# Patient Record
Sex: MALE | Race: AMERICAN INDIAN OR ALASKA NATIVE | URBAN - NONMETROPOLITAN AREA
[De-identification: names, ages, dates, MRNs, and addresses within clinical notes are randomized per-mention and may not be internally consistent; named-entity substitution may affect disease eponyms.]

---

## 2018-03-08 ENCOUNTER — HOSPITAL ENCOUNTER (EMERGENCY)
Facility: OTHER | Age: 63
Discharge: HOME | End: 2018-03-08
Attending: FAMILY MEDICINE | Admitting: FAMILY MEDICINE
Payer: COMMERCIAL

## 2018-03-08 VITALS
HEART RATE: 81 BPM | TEMPERATURE: 97.6 F | HEIGHT: 67 IN | WEIGHT: 165 LBS | BODY MASS INDEX: 25.9 KG/M2 | SYSTOLIC BLOOD PRESSURE: 126 MMHG | DIASTOLIC BLOOD PRESSURE: 69 MMHG | RESPIRATION RATE: 16 BRPM | OXYGEN SATURATION: 96 %

## 2018-03-08 DIAGNOSIS — F02.818 DEMENTIA DUE TO MEDICAL CONDITION WITH BEHAVIORAL DISTURBANCE (H): ICD-10-CM

## 2018-03-08 LAB
ALBUMIN SERPL-MCNC: 4.1 G/DL (ref 3.5–5.7)
ALP SERPL-CCNC: 89 U/L (ref 34–104)
ALT SERPL W P-5'-P-CCNC: 16 U/L (ref 7–52)
AMPHETAMINES UR QL SCN: NOT DETECTED
ANION GAP SERPL CALCULATED.3IONS-SCNC: 10 MMOL/L (ref 3–14)
AST SERPL W P-5'-P-CCNC: 18 U/L (ref 13–39)
BARBITURATES UR QL: NOT DETECTED
BASOPHILS # BLD AUTO: 0 10E9/L (ref 0–0.2)
BASOPHILS NFR BLD AUTO: 0.5 %
BENZODIAZ UR QL: NOT DETECTED
BILIRUB SERPL-MCNC: 1.3 MG/DL (ref 0.3–1)
BUN SERPL-MCNC: 13 MG/DL (ref 7–25)
BUPRENORPHINE UR QL: NOT DETECTED NG/ML
CALCIUM SERPL-MCNC: 9 MG/DL (ref 8.6–10.3)
CANNABINOIDS UR QL: NOT DETECTED NG/ML
CHLORIDE SERPL-SCNC: 101 MMOL/L (ref 98–107)
CO2 SERPL-SCNC: 27 MMOL/L (ref 21–31)
COCAINE UR QL: NOT DETECTED
CREAT SERPL-MCNC: 0.57 MG/DL (ref 0.7–1.3)
D-METHAMPHET UR QL: NOT DETECTED NG/ML
DIFFERENTIAL METHOD BLD: ABNORMAL
EOSINOPHIL # BLD AUTO: 0.1 10E9/L (ref 0–0.7)
EOSINOPHIL NFR BLD AUTO: 1.7 %
ERYTHROCYTE [DISTWIDTH] IN BLOOD BY AUTOMATED COUNT: 12.5 % (ref 10–15)
ETHANOL SERPL-MCNC: <0.01 %
GFR SERPL CREATININE-BSD FRML MDRD: >90 ML/MIN/1.7M2
GLUCOSE SERPL-MCNC: 104 MG/DL (ref 70–105)
HCT VFR BLD AUTO: 39 % (ref 40–53)
HGB BLD-MCNC: 13.8 G/DL (ref 13.3–17.7)
IMM GRANULOCYTES # BLD: 0 10E9/L (ref 0–0.4)
IMM GRANULOCYTES NFR BLD: 0.3 %
LYMPHOCYTES # BLD AUTO: 1.4 10E9/L (ref 0.8–5.3)
LYMPHOCYTES NFR BLD AUTO: 21.8 %
MCH RBC QN AUTO: 33 PG (ref 26.5–33)
MCHC RBC AUTO-ENTMCNC: 35.4 G/DL (ref 31.5–36.5)
MCV RBC AUTO: 93 FL (ref 78–100)
METHADONE UR QL SCN: NOT DETECTED
MONOCYTES # BLD AUTO: 0.8 10E9/L (ref 0–1.3)
MONOCYTES NFR BLD AUTO: 12.8 %
NEUTROPHILS # BLD AUTO: 4.1 10E9/L (ref 1.6–8.3)
NEUTROPHILS NFR BLD AUTO: 62.9 %
OPIATES UR QL SCN: NOT DETECTED
OXYCODONE UR QL: NOT DETECTED NG/ML
PCP UR QL SCN: NOT DETECTED
PLATELET # BLD AUTO: 195 10E9/L (ref 150–450)
POTASSIUM SERPL-SCNC: 3.1 MMOL/L (ref 3.5–5.1)
PROPOXYPH UR QL: NOT DETECTED NG/ML
PROT SERPL-MCNC: 7 G/DL (ref 6.4–8.9)
RBC # BLD AUTO: 4.18 10E12/L (ref 4.4–5.9)
SODIUM SERPL-SCNC: 138 MMOL/L (ref 134–144)
TRICYCLICS UR QL SCN: NOT DETECTED NG/ML
WBC # BLD AUTO: 6.4 10E9/L (ref 4–11)

## 2018-03-08 PROCEDURE — 25000132 ZZH RX MED GY IP 250 OP 250 PS 637: Performed by: FAMILY MEDICINE

## 2018-03-08 PROCEDURE — 93010 ELECTROCARDIOGRAM REPORT: CPT | Performed by: INTERNAL MEDICINE

## 2018-03-08 PROCEDURE — 93005 ELECTROCARDIOGRAM TRACING: CPT | Performed by: FAMILY MEDICINE

## 2018-03-08 PROCEDURE — 80053 COMPREHEN METABOLIC PANEL: CPT | Performed by: FAMILY MEDICINE

## 2018-03-08 PROCEDURE — 80320 DRUG SCREEN QUANTALCOHOLS: CPT | Performed by: FAMILY MEDICINE

## 2018-03-08 PROCEDURE — 85025 COMPLETE CBC W/AUTO DIFF WBC: CPT | Performed by: FAMILY MEDICINE

## 2018-03-08 PROCEDURE — 80307 DRUG TEST PRSMV CHEM ANLYZR: CPT | Performed by: FAMILY MEDICINE

## 2018-03-08 PROCEDURE — 99282 EMERGENCY DEPT VISIT SF MDM: CPT | Mod: Z6 | Performed by: FAMILY MEDICINE

## 2018-03-08 PROCEDURE — 36415 COLL VENOUS BLD VENIPUNCTURE: CPT | Performed by: FAMILY MEDICINE

## 2018-03-08 PROCEDURE — 99284 EMERGENCY DEPT VISIT MOD MDM: CPT | Mod: 25 | Performed by: FAMILY MEDICINE

## 2018-03-08 RX ORDER — POTASSIUM CHLORIDE 1.5 G/1.58G
20 POWDER, FOR SOLUTION ORAL ONCE
Status: COMPLETED | OUTPATIENT
Start: 2018-03-08 | End: 2018-03-08

## 2018-03-08 RX ORDER — BENAZEPRIL HYDROCHLORIDE AND HYDROCHLOROTHIAZIDE 20; 12.5 MG/1; MG/1
1 TABLET ORAL DAILY
COMMUNITY

## 2018-03-08 RX ORDER — AMLODIPINE BESYLATE AND ATORVASTATIN CALCIUM 10; 10 MG/1; MG/1
1 TABLET, FILM COATED ORAL DAILY
COMMUNITY

## 2018-03-08 RX ORDER — POTASSIUM CHLORIDE 1.5 G/1.58G
20 POWDER, FOR SOLUTION ORAL 2 TIMES DAILY
Qty: 30 PACKET | Refills: 1 | Status: SHIPPED | OUTPATIENT
Start: 2018-03-08 | End: 2018-03-23

## 2018-03-08 RX ORDER — HALOPERIDOL 0.5 MG/1
0.5 TABLET ORAL 3 TIMES DAILY
Qty: 30 TABLET | Refills: 1 | Status: SHIPPED | OUTPATIENT
Start: 2018-03-08

## 2018-03-08 RX ORDER — HALOPERIDOL 0.5 MG/1
0.5 TABLET ORAL ONCE
Status: COMPLETED | OUTPATIENT
Start: 2018-03-08 | End: 2018-03-08

## 2018-03-08 RX ORDER — DISULFIRAM 250 MG/1
250 TABLET ORAL DAILY
COMMUNITY

## 2018-03-08 RX ADMIN — HALOPERIDOL 0.5 MG: 0.5 TABLET ORAL at 09:03

## 2018-03-08 RX ADMIN — POTASSIUM CHLORIDE 20 MEQ: 1.5 POWDER, FOR SOLUTION ORAL at 10:16

## 2018-03-08 ASSESSMENT — ENCOUNTER SYMPTOMS
COUGH: 0
FEVER: 0
ADENOPATHY: 0
CHILLS: 0
SHORTNESS OF BREATH: 0
FATIGUE: 0
PHOTOPHOBIA: 0
FLANK PAIN: 0
SEIZURES: 0

## 2018-03-08 NOTE — ED AVS SNAPSHOT
[facility]    [address]    [place] [address]    Phone:  [phone]    Fax:  [phone]                                       Ramesh Steven   MRN: [id number]    Department:  [facility]   Date of Visit:  3/8/2018           Patient Information     Date Of Birth          1955        Your diagnoses for this visit were:     Dementia due to medical condition with behavioral disturbance        You were seen by Oj Ramires MD.      Follow-up Information     Follow up with No Ref-Primary, Physician.    Why:  As needed        Follow up with [facility].    Specialty:  EMERGENCY MEDICINE    Why:  If symptoms worsen    Contact information:    [address]  [place] [address] [phone]        Discharge Instructions         The Difference Between Delirium and Dementia  Dementia and delirium are both health conditions that change a person s ability to think clearly and care for themselves. They do share some similar signs and symptoms. But they have different causes, treatment, and outcomes.  Delirium is seen as a medical emergency that needs to be treated right away. But it can often be mistaken for dementia. In some cases, these conditions can occur at the same time. Learn how the two are different, and what you can do to help a person who has signs of either or both.  What is delirium?  Delirium is a sudden change in a person s mental state that fluctuates over short periods of time. A person will have trouble paying attention or following a conversation. Thinking and speech may be confused, illogical, unclear, and unpredictable.  A person s mental state may vary from agitated and watchful to sluggish and sleepy.  Delirium is a medical emergency. Usually, there is some underlying cause.  Delirium is treated by finding the cause. Once the cause is treated, the delirium can go away.  What is  dementia?  Dementia is a range of signs that a person s brain is losing function. With dementia, a person s ability to think, remember, and communicate with others gets weaker over time. This process occurs over years and usually progresses slowly. At first, a person may sometimes be forgetful or confused. Questions will be asked over and over. Basic information may be forgotten. Over time, he or she will have trouble following directions and doing daily tasks. The person will have trouble talking with and understanding others. Eventually, a person with dementia may forget who people are and not know where he or she is. The person may also be bhat or restless.  Knowing the difference    Dementia Delirium   Common signs Signs include forgetfulness and confusion. The person will have trouble speaking with and understanding others. This occurs over long periods of time. Signs include sudden changes in mental state. Changes may range from agitation to tiredness.   When signs appear There is a slow change in mental state and behavior over months or years. There is a sudden change in mental state and behavior over hours or days.   Thinking and attention The person may often seem confused. Over time, the person s thinking will not be sensible or logical. As the disease worsens, the person will not be able to focus well. He or she will often not be able to talk with or understand others clearly. In some cases, the person may see or hear things that others can t (hallucinations). The person will not be able to remember events that just happened, and lose memory of events in the past. The person may not remember who people are, or where they are. The person may not recognize common objects. The person may be confused and disoriented. The person may have trouble focusing and talking with others. It is likely that the person will not be able to tell a healthcare provider about his or her symptoms. The person may see or hear  things that others can t (hallucinations). The person may not be able to remember something that just happened.   Getting a diagnosis  A person with signs of dementia or delirium will need to be diagnosed correctly. In some cases, you can help. You can tell the healthcare provider how the person's mental state is different from their normal state. This can help the provider diagnose the problem.  Healthcare providers will look at different parts of a person's health. They will look at what medicine a person is taking. They will find out if the person has an infection, or if he or she has an illness that has gotten worse. They may talk with the person to learn more about his or her mental state. And they may do tests to see if there may be a cause for delirium.  When to get medical help  Someone with dementia may also have signs of delirium. This can show that there is another problem.  If someone--with or without dementia--has sudden changes in mental state, call his or her health care provider right away. Or call 911 or your local emergency number. Tell the health care provider about the signs of delirium you have seen.  Date Last Reviewed: 4/1/2017 2000-2017 [employer]. [address] [place], [place] [address]. All rights reserved. This information is not intended as a substitute for professional medical care. Always follow your healthcare professional's instructions.          24 Hour Appointment Hotline       To make an appointment at any [facility], call [phone] ([phone]). If you don't have a family doctor or clinic, we will help you find one. [facility] are conveniently located to serve the needs of you and your family.             Review of your medicines      START taking        Dose / Directions Last dose taken    haloperidol 0.5 MG tablet   Commonly known as:  HALDOL   Dose:  0.5 mg   Quantity:  30 tablet        Take 1 tablet (0.5 mg) by mouth 3 times daily    Refills:  1        potassium chloride 20 MEQ Packet   Commonly known as:  KLOR-CON   Dose:  20 mEq   Quantity:  30 packet        Take 20 mEq by mouth 2 times daily for 30 doses   Refills:  1          Our records show that you are taking the medicines listed below. If these are incorrect, please call your family doctor or clinic.        Dose / Directions Last dose taken    amLODIPine-atorvastatin 10-10 MG per tablet   Commonly known as:  CADUET   Dose:  1 tablet        Take 1 tablet by mouth daily   Refills:  0        benazepril-hydrochlorthiazide 20-12.5 MG per tablet   Commonly known as:  LOTENSIN HCT   Dose:  1 tablet        Take 1 tablet by mouth daily   Refills:  0        CYANOCOBALAMIN PO        Refills:  0        disulfiram 250 MG tablet   Commonly known as:  ANTABUSE   Dose:  250 mg        Take 250 mg by mouth daily   Refills:  0        LIPITOR PO   Dose:  10 mg        Take 10 mg by mouth   Refills:  0        PRILOSEC OTC PO        Refills:  0        VITAMIN A PO        Refills:  0        VITAMIN E COMPLEX PO        Refills:  0        WELLBUTRIN SR PO        Refills:  0                Prescriptions were sent or printed at these locations (2 Prescriptions)                   [id number] - [place], [place] - [address]   [address] [address]    Telephone:  [phone]   Fax:  [phone]   Hours:                  E-Prescribed (2 of 2)         haloperidol (HALDOL) 0.5 MG tablet               potassium chloride (KLOR-CON) 20 MEQ Packet                Procedures and tests performed during your visit     CBC with platelets differential    Comprehensive metabolic panel    Drug of Abuse Screen Urine GH    EKG 12-lead, tracing only    Ethanol GH      Orders Needing Specimen Collection     None      Pending Results     Date and Time Order Name Status Description    3/8/2018 0836 EKG 12-lead, tracing only In process             Pending Culture Results     No orders  "found from 3/6/2018 to 3/9/2018.            Thank you for choosing [facility]       Thank you for choosing [facility] for your care. Our goal is always to provide you with excellent care. Hearing back from our patients is one way we can continue to improve our services. Please take a few minutes to complete the written survey that you may receive in the mail after you visit with us. Thank you!        [email or web address] Information     [employer] lets you send messages to your doctor, view your test results, renew your prescriptions, schedule appointments and more. To sign up, go to [email or web address] . Click on \"Log in\" on the left side of the screen, which will take you to the Welcome page. Then click on \"Sign up Now\" on the right side of the page.     You will be asked to enter the access code listed below, as well as some personal information. Please follow the directions to create your username and password.     Your access code is: [email or web address]  Expires: 2018 10:30 AM     Your access code will  in 90 days. If you need help or a new code, please call your [facility] clinic or [phone].        Care EveryWhere ID     This is your Care EveryWhere ID. This could be used by other organizations to access your [facility] medical records  [id number]        Equal Access to Services     OSIEL DAUGHERTY : Hadii gerda Pollack, waaxda luqadaha, qaybta kaalmada ilya, sang delarosa . So [facility] [phone].    ATENCIÓN: Si habla español, tiene a patten disposición servicios gratuitos de asistencia lingüística. Llame al [phone].    We comply with applicable federal civil rights laws and [place] laws. We do not discriminate on the basis of race, color, national origin, age, disability, sex, sexual orientation, or gender identity.            After Visit Summary       This is your record. Keep this with you and show to your community pharmacist(s) and doctor(s) at your next visit.             "

## 2018-03-08 NOTE — ED NOTES
Patient ambulatory steady to bay 3.  Remains alert and oriented, no shaking noted.  Is cooperative and unsure why he is here.

## 2018-03-08 NOTE — ED AVS SNAPSHOT
[facility]    [address]    [place] [address]    Phone:  [phone]    Fax:  [phone]                                       Ramesh Steven   MRN: [id number]    Department:  [facility]   Date of Visit:  3/8/2018           After Visit Summary Signature Page     I have received my discharge instructions, and my questions have been answered. I have discussed any challenges I see with this plan with the nurse or doctor.    ..........................................................................................................................................  Patient/Patient Representative Signature      ..........................................................................................................................................  Patient Representative Print Name and Relationship to Patient    ..................................................               ................................................  Date                                            Time    ..........................................................................................................................................  Reviewed by Signature/Title    ...................................................              ..............................................  Date                                                            Time

## 2018-03-08 NOTE — DISCHARGE INSTRUCTIONS
The Difference Between Delirium and Dementia  Dementia and delirium are both health conditions that change a person s ability to think clearly and care for themselves. They do share some similar signs and symptoms. But they have different causes, treatment, and outcomes.  Delirium is seen as a medical emergency that needs to be treated right away. But it can often be mistaken for dementia. In some cases, these conditions can occur at the same time. Learn how the two are different, and what you can do to help a person who has signs of either or both.  What is delirium?  Delirium is a sudden change in a person s mental state that fluctuates over short periods of time. A person will have trouble paying attention or following a conversation. Thinking and speech may be confused, illogical, unclear, and unpredictable.  A person s mental state may vary from agitated and watchful to sluggish and sleepy.  Delirium is a medical emergency. Usually, there is some underlying cause.  Delirium is treated by finding the cause. Once the cause is treated, the delirium can go away.  What is dementia?  Dementia is a range of signs that a person s brain is losing function. With dementia, a person s ability to think, remember, and communicate with others gets weaker over time. This process occurs over years and usually progresses slowly. At first, a person may sometimes be forgetful or confused. Questions will be asked over and over. Basic information may be forgotten. Over time, he or she will have trouble following directions and doing daily tasks. The person will have trouble talking with and understanding others. Eventually, a person with dementia may forget who people are and not know where he or she is. The person may also be bhat or restless.  Knowing the difference    Dementia Delirium   Common signs Signs include forgetfulness and confusion. The person will have trouble speaking with and understanding others. This occurs over  long periods of time. Signs include sudden changes in mental state. Changes may range from agitation to tiredness.   When signs appear There is a slow change in mental state and behavior over months or years. There is a sudden change in mental state and behavior over hours or days.   Thinking and attention The person may often seem confused. Over time, the person s thinking will not be sensible or logical. As the disease worsens, the person will not be able to focus well. He or she will often not be able to talk with or understand others clearly. In some cases, the person may see or hear things that others can t (hallucinations). The person will not be able to remember events that just happened, and lose memory of events in the past. The person may not remember who people are, or where they are. The person may not recognize common objects. The person may be confused and disoriented. The person may have trouble focusing and talking with others. It is likely that the person will not be able to tell a healthcare provider about his or her symptoms. The person may see or hear things that others can t (hallucinations). The person may not be able to remember something that just happened.   Getting a diagnosis  A person with signs of dementia or delirium will need to be diagnosed correctly. In some cases, you can help. You can tell the healthcare provider how the person's mental state is different from their normal state. This can help the provider diagnose the problem.  Healthcare providers will look at different parts of a person's health. They will look at what medicine a person is taking. They will find out if the person has an infection, or if he or she has an illness that has gotten worse. They may talk with the person to learn more about his or her mental state. And they may do tests to see if there may be a cause for delirium.  When to get medical help  Someone with dementia may also have signs of delirium. This can  show that there is another problem.  If someone--with or without dementia--has sudden changes in mental state, call his or her health care provider right away. Or call 911 or your local emergency number. Tell the health care provider about the signs of delirium you have seen.  Date Last Reviewed: 4/1/2017 2000-2017 [employer]. [address] [place] [address], [place] [address]. All rights reserved. This information is not intended as a substitute for professional medical care. Always follow your healthcare professional's instructions.

## 2018-03-08 NOTE — ED PROVIDER NOTES
History     Chief Complaint   Patient presents with     Altered Mental Status     was at detox         Call placed to wife who states the patient has had long and short term memory loss for a while.  Short term has worsened recently.  She states detox called her and stated he was having worsening withdrawal symptoms and needed to go to the ED.            I personally reviewed nurse's notes above, similar history related to me  HPI  Ramesh Steven is a 62 year old male who resents the emergency department for behavioral disturbance.  I discussed with Razia from [facility] in recovery.  Apparently patient has had long-term dementia but he had episode of agitation this morning.  He does have suspected underlying dementia but also has history of alcohol use.  He was at detox for a period of stabilization [facility] they think they can get him safely into a geriatric memory care type facility up in [place].  The family is working on this.  She has no complaints at this time and appears to be oriented.    Problem List:    There are no active problems to display for this patient.       Past Medical History:    No past medical history on file.    Past Surgical History:    No past surgical history on file.    Family History:    No family history on file.    Social History:  Marital Status:   [2]  Social History   Substance Use Topics     Smoking status: Not on file     Smokeless tobacco: Not on file     Alcohol use Not on file        Medications:      Atorvastatin Calcium (LIPITOR PO)   VITAMIN E COMPLEX PO   haloperidol (HALDOL) 0.5 MG tablet   potassium chloride (KLOR-CON) 20 MEQ Packet   amLODIPine-atorvastatin (CADUET) 10-10 MG per tablet   benazepril-hydrochlorthiazide (LOTENSIN HCT) 20-12.5 MG per tablet   BuPROPion HCl (WELLBUTRIN SR PO)   CYANOCOBALAMIN PO   disulfiram (ANTABUSE) 250 MG tablet   Omeprazole Magnesium (PRILOSEC OTC PO)   VITAMIN A PO         Review of Systems   Constitutional: Negative for chills,  "fatigue and fever.   Eyes: Negative for photophobia.   Respiratory: Negative for cough and shortness of breath.    Endocrine: Negative for polyuria.   Genitourinary: Negative for flank pain.   Neurological: Negative for seizures.   Hematological: Negative for adenopathy.   All other systems reviewed and are negative.      Physical Exam   BP: 161/90  Pulse: 81  Temp: 97.6  F (36.4  C)  Resp: 16  Height: 170.2 cm (5' 7\")  Weight: 74.8 kg (165 lb)  SpO2: 98 %      Physical Exam   Constitutional: He is oriented to person, place, and time.   Cardiovascular: Normal rate.    No murmur heard.  Pulmonary/Chest: Effort normal and breath sounds normal. No respiratory distress. He has no wheezes. He has no rales.   Abdominal: Soft. Bowel sounds are normal.   Musculoskeletal: He exhibits no edema.   Neurological: He is alert and oriented to person, place, and time. He has normal reflexes.   Nursing note and vitals reviewed.      ED Course     ED Course     Procedures           EKG, sinus rhythm and occasional PVC, QTC is 445, normal ST segments       Labs Ordered and Resulted from Time of ED Arrival Up to the Time of Departure from the ED   COMPREHENSIVE METABOLIC PANEL - Abnormal; Notable for the following:        Result Value    Potassium 3.1 (*)     Creatinine 0.57 (*)     Bilirubin Total 1.3 (*)     All other components within normal limits   CBC WITH PLATELETS DIFFERENTIAL - Abnormal; Notable for the following:     RBC Count 4.18 (*)     Hematocrit 39.0 (*)     All other components within normal limits   ETHANOL GH   DRUG OF ABUSE SCREEN URINE GH     Allergies   Allergen Reactions     Erythromycin Shortness Of Breath     No Clinical Screening - See Comments Swelling and Anaphylaxis     Uvula swells when breaths in smoke from scented candles and from breathing in smoke from some woods      Oxycodone Other (See Comments)     Pt states this medication does nothing for him     Sulfa Drugs Rash     Sulfamethoxazole " "W/Trimethoprim Hives     Aleve [Naproxen] Unknown     Ibuprofen Unknown     Augmentin Es-600 GI Disturbance     Ketorolac Tromethamine Unknown     Patient Vitals for the past 24 hrs:   BP Temp Temp src Pulse Resp SpO2 Height Weight   03/08/18 0905 - - - - - 96 % - -   03/08/18 0904 126/69 - - - - - - -   03/08/18 0804 161/90 97.6  F (36.4  C) Tympanic 81 16 98 % 1.702 m (5' 7\") 74.8 kg (165 lb)     Results for orders placed or performed during the hospital encounter of 03/08/18   Comprehensive metabolic panel   Result Value Ref Range    Sodium 138 134 - 144 mmol/L    Potassium 3.1 (L) 3.5 - 5.1 mmol/L    Chloride 101 98 - 107 mmol/L    Carbon Dioxide 27 21 - 31 mmol/L    Anion Gap 10 3 - 14 mmol/L    Glucose 104 70 - 105 mg/dL    Urea Nitrogen 13 7 - 25 mg/dL    Creatinine 0.57 (L) 0.70 - 1.30 mg/dL    GFR Estimate >90 >60 mL/min/1.7m2    GFR Estimate If Black >90 >60 mL/min/1.7m2    Calcium 9.0 8.6 - 10.3 mg/dL    Bilirubin Total 1.3 (H) 0.3 - 1.0 mg/dL    Albumin 4.1 3.5 - 5.7 g/dL    Protein Total 7.0 6.4 - 8.9 g/dL    Alkaline Phosphatase 89 34 - 104 U/L    ALT 16 7 - 52 U/L    AST 18 13 - 39 U/L   CBC with platelets differential   Result Value Ref Range    WBC 6.4 4.0 - 11.0 10e9/L    RBC Count 4.18 (L) 4.4 - 5.9 10e12/L    Hemoglobin 13.8 13.3 - 17.7 g/dL    Hematocrit 39.0 (L) 40.0 - 53.0 %    MCV 93 78 - 100 fl    MCH 33.0 26.5 - 33.0 pg    MCHC 35.4 31.5 - 36.5 g/dL    RDW 12.5 10.0 - 15.0 %    Platelet Count 195 150 - 450 10e9/L    Diff Method Automated Method     % Neutrophils 62.9 %    % Lymphocytes 21.8 %    % Monocytes 12.8 %    % Eosinophils 1.7 %    % Basophils 0.5 %    % Immature Granulocytes 0.3 %    Absolute Neutrophil 4.1 1.6 - 8.3 10e9/L    Absolute Lymphocytes 1.4 0.8 - 5.3 10e9/L    Absolute Monocytes 0.8 0.0 - 1.3 10e9/L    Absolute Eosinophils 0.1 0.0 - 0.7 10e9/L    Absolute Basophils 0.0 0.0 - 0.2 10e9/L    Abs Immature Granulocytes 0.0 0 - 0.4 10e9/L   Ethanol GH   Result Value Ref Range "    Ethanol g/dL <0.01 <0.01 %   Drug of Abuse Screen Urine GH   Result Value Ref Range    Amphetamine Qual Urine Not Detected NDET^Not Detected    Benzodiazepine Qual Urine Not Detected NDET^Not Detected    Cocaine Qual Urine Not Detected NDET^Not Detected    Methadone Qual Urine Not Detected NDET^Not Detected    PCP Qual Urine Not Detected NDET^Not Detected    Opiates Qualitative Urine Not Detected NDET^Not Detected    Oxycodone Qualitative Urine Not Detected NDET^Not Detected ng/mL    Propoxyphene Qualitative Urine Not Detected NDET^Not Detected ng/mL    Tricyclic Antidepressants Qual Urine Not Detected NDET^Not Detected ng/mL    Methamphetamine Qualitative Urine Not Detected NDET^Not Detected ng/mL    Barbiturates Qual Urine Not Detected NDET^Not Detected    Cannabinoids Qualitative Urine Not Detected NDET^Not Detected ng/mL    Buprenorphine Qualitative Urine Not Detected NDET^Not Detected ng/mL     Orders Placed This Encounter   Procedures     Comprehensive metabolic panel     Standing Status:   Standing     Number of Occurrences:   1     CBC with platelets differential     Last Lab Result: No results found for: HGB     Standing Status:   Standing     Number of Occurrences:   1     Ethanol GH     Standing Status:   Standing     Number of Occurrences:   1     Drug of Abuse Screen Urine GH     Standing Status:   Standing     Number of Occurrences:   1     Medications   haloperidol (HALDOL) tablet 0.5 mg (0.5 mg Oral Given 3/8/18 0903)   potassium chloride (KLOR-CON) Packet 20 mEq (20 mEq Oral Given 3/8/18 1016)       New Prescriptions    HALOPERIDOL (HALDOL) 0.5 MG TABLET    Take 1 tablet (0.5 mg) by mouth 3 times daily    POTASSIUM CHLORIDE (KLOR-CON) 20 MEQ PACKET    Take 20 mEq by mouth 2 times daily for 30 doses       Final diagnoses:   Dementia due to medical condition with behavioral disturbance     Mild hypokalemia, will prescribe potassium to chey White.  Stop Ativan use at [facility] trial of Haldol  0.5mg 3 times daily for symptom relief and delirium relief.  Discussed with Loni BURT at [facility].  Patient is cooperative, labs are reassuring return to ER with worsening symptoms but I feel he is medically stable to go back to [facility], I feel like definitive placement at memory care unit would be appropriate as well.  We have kept the wife updated by phone while the patient has been here.  3/8/2018   [facility] AND [facility]     Oj Ramires MD  03/08/18 1024

## 2018-03-08 NOTE — ED NOTES
Call placed to wife who states the patient has had long and short term memory loss for a while.  Short term has worsened recently.  She states detox called her and stated he was having worsening withdrawal symptoms and needed to go to the ED.